# Patient Record
Sex: MALE | Race: BLACK OR AFRICAN AMERICAN | URBAN - METROPOLITAN AREA
[De-identification: names, ages, dates, MRNs, and addresses within clinical notes are randomized per-mention and may not be internally consistent; named-entity substitution may affect disease eponyms.]

---

## 2018-03-08 ENCOUNTER — OFFICE VISIT (OUTPATIENT)
Dept: FAMILY MEDICINE CLINIC | Age: 28
End: 2018-03-08

## 2018-03-08 VITALS
TEMPERATURE: 97.8 F | WEIGHT: 251 LBS | BODY MASS INDEX: 34 KG/M2 | HEART RATE: 90 BPM | RESPIRATION RATE: 17 BRPM | DIASTOLIC BLOOD PRESSURE: 93 MMHG | HEIGHT: 72 IN | SYSTOLIC BLOOD PRESSURE: 141 MMHG | OXYGEN SATURATION: 100 %

## 2018-03-08 DIAGNOSIS — Z11.3 SCREENING FOR STD (SEXUALLY TRANSMITTED DISEASE): Primary | ICD-10-CM

## 2018-03-08 DIAGNOSIS — I10 ESSENTIAL HYPERTENSION: ICD-10-CM

## 2018-03-08 RX ORDER — METRONIDAZOLE 500 MG/1
500 TABLET ORAL 2 TIMES DAILY
Qty: 14 TAB | Refills: 0 | Status: SHIPPED | OUTPATIENT
Start: 2018-03-08 | End: 2018-03-15

## 2018-03-08 NOTE — PROGRESS NOTES
Rola Meade is a 28 y.o.  male and presents with     Chief Complaint   Patient presents with   [address] Hypertension    Exposure to STD       Pt is sexually active with his female partmenr for a month. His partner went for routine testing and was told she had trichomonas. Pt had STD in 2009 - Gonorrhea and chlamydia. Pt wants to make sure that he does not have any STD. Pt does not think he has HTN. NO FH for HTN. History reviewed. No pertinent past medical history. Past Surgical History:   Procedure Laterality Date    HX ORTHOPAEDIC       Current Outpatient Prescriptions   Medication Sig    metroNIDAZOLE (FLAGYL) 500 mg tablet Take 1 Tab by mouth two (2) times a day for 7 days. No current facility-administered medications for this visit. Health Maintenance   Topic Date Due    DTaP/Tdap/Td series (1 - Tdap) 03/11/2010    Influenza Age 5 to Adult  08/01/2017       There is no immunization history on file for this patient. No LMP for male patient. Allergies and Intolerances:   No Known Allergies    Family History:   Family History   Problem Relation Age of Onset    Diabetes Mother     Diabetes Father     Hypertension Father        Social History:   He  reports that he has been smoking. He has never used smokeless tobacco.  He  reports that he drinks alcohol.             Review of Systems:   General: negative for - chills, fatigue, fever, weight change  Psych: negative for - anxiety, depression, irritability or mood swings  ENT: negative for - headaches, hearing change, nasal congestion, oral lesions, sneezing or sore throat  Heme/ Lymph: negative for - bleeding problems, bruising, pallor or swollen lymph nodes  Endo: negative for - hot flashes, polydipsia/polyuria or temperature intolerance  Resp: negative for - cough, shortness of breath or wheezing  CV: negative for - chest pain, edema or palpitations  GI: negative for - abdominal pain, change in bowel habits, constipation, diarrhea or nausea/vomiting  : negative for - dysuria, hematuria, incontinence, pelvic pain or vulvar/vaginal symptoms  MSK: negative for - joint pain, joint swelling or muscle pain  Neuro: negative for - confusion, headaches, seizures or weakness  Derm: negative for - dry skin, hair changes, rash or skin lesion changes          Physical:   Vitals:   Vitals:    03/08/18 0929   BP: (!) 141/93   Pulse: 90   Resp: 17   Temp: 97.8 °F (36.6 °C)   TempSrc: Oral   SpO2: 100%   Weight: 251 lb (113.9 kg)   Height: 6' (1.829 m)           Exam:   HEENT- atraumatic,normocephalic, awake, oriented, well nourished  Neck - supple,no enlarged lymph nodes, no JVD, no thyromegaly  Chest- CTA, no rhonchi, no crackles  Heart- rrr, no murmurs / gallop/rub  Abdomen- soft,BS+,NT, no hepatosplenomegaly  Ext - no c/c/edema   Neuro- no focal deficits. Power 5/5 all extremities  Skin - warm,dry, no obvious rashes. Pt declined  exam.      Review of Data:   LABS:   No results found for: WBC, HGB, HCT, PLT, HGBEXT, HCTEXT, PLTEXT  No results found for: NA, K, CL, CO2, GLU, BUN, CREA  No results found for: CHOL, CHOLX, CHLST, CHOLV, HDL, LDL, LDLC, DLDLP, TGLX, TRIGL, TRIGP  No results found for: GPT        Impression / Plan:        ICD-10-CM ICD-9-CM    1. Screening for STD (sexually transmitted disease) Z11.3 V74.5 CHLAMYDIA/NEISSERIA/TRICHOMONAS AMP      RPR      HIV 1/2 AG/AB, 4TH GENERATION,W RFLX CONFIRM      metroNIDAZOLE (FLAGYL) 500 mg tablet      HSV-1 AB, IGG GLYCOPROTEIN, G-SPECIFIC      HSV-2 AB, IGG GLYCOPROTEIN, G-SPECIFIC      HSV 1 AND 2 ABS, IGM   2. Essential hypertension G35 572.8 METABOLIC PANEL, COMPREHENSIVE     Low salt diet. Given that partmer is pos for Trich, will empirically treat pt. Explained to patient risk benefits of the medications. Advised patient to stop meds if having any side effects. Pt verbalized understanding of the instructions.     I have discussed the diagnosis with the patient and the intended plan as seen in the above orders. The patient has received an after-visit summary and questions were answered concerning future plans. I have discussed medication side effects and warnings with the patient as well. I have reviewed the plan of care with the patient, accepted their input and they are in agreement with the treatment goals. Reviewed plan of care. Patient has provided input and agrees with goals.     Follow-up Disposition: Not on Glo Manriquez MD

## 2018-03-08 NOTE — MR AVS SNAPSHOT
Cindy Ly 
 
 
 [address] [facility] [address] [place] [id number] 
[phone] Patient: Orlin Tanner MRN: [id number] Visit Information Date & Time Provider Department Dept. Phone Encounter #  
 3/8/2018  9:30 AM [id number] ALISON Huerta, [address] [facility] [phone] Follow-up Instructions Return in about 1 month (around 4/8/2018). Upcoming Health Maintenance Date Due DTaP/Tdap/Td series (1 - Tdap) 3/11/2010 Influenza Age 5 to Adult 8/1/2017 Allergies as of 3/8/2018  Review Complete On: 3/8/2018 By: [id number] ALISON Huerta MD  
 No Known Allergies Current Immunizations  Never Reviewed No immunizations on file. Not reviewed this visit You Were Diagnosed With   
  
 Codes Comments Screening for STD (sexually transmitted disease)    -  Primary ICD-10-CM: Z11.3 ICD-9-CM: V74.5 Essential hypertension     ICD-10-CM: I10 
ICD-9-CM: 401.9 Vitals BP Pulse Temp Resp Height(growth percentile) Weight(growth percentile) (!) 141/93 90 97.8 °F (36.6 °C) (Oral) 17 6' (1.829 m) 251 lb (113.9 kg) SpO2 BMI Smoking Status 100% 34.04 kg/m2 Current Every Day Smoker Vitals History BMI and BSA Data Body Mass Index Body Surface Area 34.04 kg/m 2 2.41 m 2 Preferred Pharmacy Pharmacy Name Phone [facility], [address] [place] [address] [phone] Your Updated Medication List  
  
   
This list is accurate as of 3/8/18 10:12 AM.  Always use your most recent med list.  
  
  
  
  
 metroNIDAZOLE 500 mg tablet Commonly known as:  FLAGYL Take 1 Tab by mouth two (2) times a day for 7 days. Prescriptions Sent to Pharmacy Refills  
 metroNIDAZOLE (FLAGYL) 500 mg tablet 0 Sig: Take 1 Tab by mouth two (2) times a day for 7 days.   
 Class: Normal  
 Pharmacy: \[facility]\"" [id number] [address] [facility], [facility] #: [phone] Route: Oral  
  
Follow-up Instructions Return in about 1 month (around 4/8/2018). To-Do List   
 03/08/2018 Lab:  CHLAMYDIA/NEISSERIA/TRICHOMONAS AMP   
  
 03/08/2018 Lab:  HIV 1/2 AG/AB, 4TH GENERATION,W RFLX CONFIRM   
  
 03/08/2018 Lab:  HSV 1 AND 2 ABS, IGM   
  
 03/08/2018 Lab:  HSV-1 AB, IGG GLYCOPROTEIN, G-SPECIFIC   
  
 03/08/2018 Lab:  HSV-2 AB, IGG GLYCOPROTEIN, G-SPECIFIC   
  
 03/08/2018 Lab:  METABOLIC PANEL, COMPREHENSIVE   
  
 03/08/2018 Lab:  [facility] Introducing [facility]! Yohana Guevara introduces Sylantro patient portal. Now you can access parts of your medical record, email your doctor's office, and request medication refills online. 1. In your internet browser, go to [email or web address] 2. Click on the First Time User? Click Here link in the Sign In box. You will see the New Member Sign Up page. 3. Enter your Sylantro Access Code exactly as it appears below. You will not need to use this code after youve completed the sign-up process. If you do not sign up before the expiration date, you must request a new code. · Sylantro Access Code: [email or web address] Expires: 6/6/2018  9:20 AM 
 
4. Enter the last four digits of your Social Security Number (xxxx) and Date of Birth (mm/dd/yyyy) as indicated and click Submit. You will be taken to the next sign-up page. 5. Create a [employer] ID. This will be your Sylantro login ID and cannot be changed, so think of one that is secure and easy to remember. 6. Create a Sylantro password. You can change your password at any time. 7. Enter your Password Reset Question and Answer. This can be used at a later time if you forget your password. 8. Enter your e-mail address. You will receive e-mail notification when new information is available in [address]. 9. Click Sign Up. You can now view and download portions of your medical record. 10. Click the Download Summary menu link to download a portable copy of your medical information. If you have questions, please visit the Frequently Asked Questions section of the Wellpepper website. Remember, Wellpepper is NOT to be used for urgent needs. For medical emergencies, dial 911. Now available from your iPhone and Android! Please provide this summary of care documentation to your next provider. Your primary care clinician is listed as Cheyanne Araujo. If you have any questions after today's visit, please call [phone].

## 2018-03-09 LAB
A-G RATIO,AGRAT: 1.6 RATIO (ref 1.1–2.6)
ALBUMIN SERPL-MCNC: 5 G/DL (ref 3.5–5)
ALP SERPL-CCNC: 67 U/L (ref 25–115)
ALT SERPL-CCNC: 15 U/L (ref 5–40)
ANION GAP SERPL CALC-SCNC: 15 MMOL/L
AST SERPL W P-5'-P-CCNC: 18 U/L (ref 10–37)
BILIRUB SERPL-MCNC: 0.3 MG/DL (ref 0.2–1.2)
BUN SERPL-MCNC: 12 MG/DL (ref 6–22)
CALCIUM SERPL-MCNC: 9.6 MG/DL (ref 8.4–10.4)
CHLAMYDIA AMPLIFIED URINE: NEGATIVE
CHLORIDE SERPL-SCNC: 101 MMOL/L (ref 98–110)
CO2 SERPL-SCNC: 26 MMOL/L (ref 20–32)
CREAT SERPL-MCNC: 1.2 MG/DL (ref 0.5–1.2)
GC AMPLIFIED URINE,919: NEGATIVE
GFRAA, 66117: >60
GFRNA, 66118: >60
GLOBULIN,GLOB: 3.1 G/DL (ref 2–4)
GLUCOSE SERPL-MCNC: 88 MG/DL (ref 70–99)
HIV -1/0/2 AG/AB WITH REFLEX, 13463: NON REACTIVE
HIV 1 & 2 AB SER-IMP: NORMAL
HSV 2 AB,IGG, HS2G: <0.2 AI
HSV1 IGG SER QL: <0.2 AI
POTASSIUM SERPL-SCNC: 4.2 MMOL/L (ref 3.5–5.5)
PROT SERPL-MCNC: 8.1 G/DL (ref 6.4–8.3)
SODIUM SERPL-SCNC: 142 MMOL/L (ref 133–145)
SYPHILIS (T. PALLIDUM) IGG, 15809: NON REACTIVE
TRICH NUCU, 17621: NEGATIVE

## 2018-03-13 LAB
HSV 1 IGM ANTIBODIES, 165181: NORMAL TITER
HSV 2 IGM ANTIBODIES, 165182: NORMAL TITER

## 2018-03-16 ENCOUNTER — TELEPHONE (OUTPATIENT)
Dept: FAMILY MEDICINE CLINIC | Age: 28
End: 2018-03-16

## 2018-03-19 NOTE — TELEPHONE ENCOUNTER
Labs were neg for STDs, only one test is pending, for Herpes . RPR, HIV, Chlamydia, Trich, Yusuf , metabolic panel were neg. HSV 1 and 2 IgG shows pending     Spoke with patient. Pending labs are in and all of them are negative. This encounter will be closed.

## 2018-04-09 ENCOUNTER — OFFICE VISIT (OUTPATIENT)
Dept: FAMILY MEDICINE CLINIC | Age: 28
End: 2018-04-09

## 2018-04-09 VITALS
HEART RATE: 71 BPM | TEMPERATURE: 97.6 F | BODY MASS INDEX: 34.27 KG/M2 | WEIGHT: 253 LBS | OXYGEN SATURATION: 98 % | SYSTOLIC BLOOD PRESSURE: 141 MMHG | DIASTOLIC BLOOD PRESSURE: 83 MMHG | RESPIRATION RATE: 16 BRPM | HEIGHT: 72 IN

## 2018-04-09 DIAGNOSIS — E66.9 OBESITY (BMI 30-39.9): ICD-10-CM

## 2018-04-09 DIAGNOSIS — R30.0 DYSURIA: ICD-10-CM

## 2018-04-09 DIAGNOSIS — I10 ESSENTIAL HYPERTENSION: ICD-10-CM

## 2018-04-09 DIAGNOSIS — Z11.3 SCREENING FOR STD (SEXUALLY TRANSMITTED DISEASE): Primary | ICD-10-CM

## 2018-04-09 NOTE — PROGRESS NOTES
1. Have you been to the ER, urgent care clinic since your last visit? Hospitalized since your last visit? No    2. Have you seen or consulted any other health care providers outside of the [address] [place] [address] since your last visit? Include any pap smears or colon screening.  No

## 2018-04-09 NOTE — PROGRESS NOTES
Nicolas Brewer is a 33 y.o.  male and presents with     Chief Complaint   Patient presents with    Follow-up    Hypertension       Pt says his girlfriend had trich and wants to be rechecked. Pt says high blood pressure runs in his family. Father has HTn and takes meds for HTN. History reviewed. No pertinent past medical history. Past Surgical History:   Procedure Laterality Date    HX ORTHOPAEDIC       No current outpatient prescriptions on file. No current facility-administered medications for this visit. Health Maintenance   Topic Date Due    Pneumococcal 19-64 Medium Risk (1 of 1 - PPSV23) 03/11/2008    DTaP/Tdap/Td series (1 - Tdap) 03/11/2010    Influenza Age 9 to Adult  Addressed       There is no immunization history on file for this patient. No LMP for male patient. Allergies and Intolerances:   No Known Allergies    Family History:   Family History   Problem Relation Age of Onset    Diabetes Mother     Diabetes Father     Hypertension Father        Social History:   He  reports that he has been smoking. He has never used smokeless tobacco.  He  reports that he drinks alcohol.             Review of Systems:   General: negative for - chills, fatigue, fever, weight change  Psych: negative for - anxiety, depression, irritability or mood swings  ENT: negative for - headaches, hearing change, nasal congestion, oral lesions, sneezing or sore throat  Heme/ Lymph: negative for - bleeding problems, bruising, pallor or swollen lymph nodes  Endo: negative for - hot flashes, polydipsia/polyuria or temperature intolerance  Resp: negative for - cough, shortness of breath or wheezing  CV: negative for - chest pain, edema or palpitations  GI: negative for - abdominal pain, change in bowel habits, constipation, diarrhea or nausea/vomiting  : negative for - dysuria, hematuria, incontinence, pelvic pain or vulvar/vaginal symptoms  MSK: negative for - joint pain, joint swelling or muscle pain  Neuro: negative for - confusion, headaches, seizures or weakness  Derm: negative for - dry skin, hair changes, rash or skin lesion changes          Physical:   Vitals:   Vitals:    04/09/18 0915   BP: 141/83   Pulse: 71   Resp: 16   Temp: 97.6 °F (36.4 °C)   TempSrc: Oral   SpO2: 98%   Weight: 253 lb (114.8 kg)   Height: 6' (1.829 m)           Exam:   HEENT- atraumatic,normocephalic, awake, oriented, well nourished  Neck - supple,no enlarged lymph nodes, no JVD, no thyromegaly  Chest- CTA, no rhonchi, no crackles  Heart- rrr, no murmurs / gallop/rub  Abdomen- soft,BS+,NT, no hepatosplenomegaly  Ext - no c/c/edema   Neuro- no focal deficits. Power 5/5 all extremities  Skin - warm,dry, no obvious rashes. Review of Data:   LABS:   No results found for: WBC, HGB, HCT, PLT, HGBEXT, HCTEXT, PLTEXT, HGBEXT, HCTEXT, PLTEXT  Lab Results   Component Value Date/Time    Sodium 142 03/08/2018 10:18 AM    Potassium 4.2 03/08/2018 10:18 AM    Chloride 101 03/08/2018 10:18 AM    CO2 26 03/08/2018 10:18 AM    Glucose 88 03/08/2018 10:18 AM    BUN 12 03/08/2018 10:18 AM    Creatinine 1.2 03/08/2018 10:18 AM     No results found for: CHOL, CHOLX, CHLST, CHOLV, HDL, LDL, LDLC, DLDLP, TGLX, TRIGL, TRIGP  No results found for: GPT        Impression / Plan:        ICD-10-CM ICD-9-CM    1. Screening for STD (sexually transmitted disease) Z11.3 V74.5 CT/NG/T.VAGINALIS AMPLIFICATION   2. Essential hypertension I10 401.9    3. Obesity (BMI 30-39. 9) E66.9 278.00 LIPID PANEL     Low salt diet. Explained to patient risk benefits of the medications. Advised patient to stop meds if having any side effects. Pt verbalized understanding of the instructions. I have discussed the diagnosis with the patient and the intended plan as seen in the above orders. The patient has received an after-visit summary and questions were answered concerning future plans.   I have discussed medication side effects and warnings with the patient as well. I have reviewed the plan of care with the patient, accepted their input and they are in agreement with the treatment goals. Reviewed plan of care. Patient has provided input and agrees with goals.     Follow-up Disposition: Not on [place] MD Catalino

## 2018-04-09 NOTE — MR AVS SNAPSHOT
[address] [facility] 
 
 
 [address] [facility] [address] [place] [id number] 
[phone] Patient: Erik Velazquez MRN: [id number] Visit Information Date & Time Provider Department Dept. Phone Encounter #  
 4/9/2018  9:00 AM Elicia Su, [address] [facility]  Follow-up Instructions Return in about 2 months (around 6/9/2018). Upcoming Health Maintenance Date Due Pneumococcal 19-64 Medium Risk (1 of 1 - PPSV23) 3/11/2008 DTaP/Tdap/Td series (1 - Tdap) 3/11/2010 Allergies as of 4/9/2018  Review Complete On: 3/8/2018 By: Elicia Su MD  
 No Known Allergies Current Immunizations  Never Reviewed No immunizations on file. Not reviewed this visit You Were Diagnosed With   
  
 Codes Comments Screening for STD (sexually transmitted disease)    -  Primary ICD-10-CM: Z11.3 ICD-9-CM: V74.5 Essential hypertension     ICD-10-CM: I10 
ICD-9-CM: 401.9 Obesity (BMI 30-39. 9)     ICD-10-CM: E66.9 ICD-9-CM: 278.00 Vitals BP Pulse Temp Resp Height(growth percentile) Weight(growth percentile) 141/83 71 97.6 °F (36.4 °C) (Oral) 16 6' (1.829 m) 253 lb (114.8 kg) SpO2 BMI Smoking Status 98% 34.31 kg/m2 Current Every Day Smoker Vitals History BMI and BSA Data Body Mass Index Body Surface Area  
 34.31 kg/m 2 2.41 m 2 Preferred Pharmacy Pharmacy Name Phone [facility] [facility], [facility] [phone] Your Updated Medication List  
  
Notice  As of 4/9/2018  9:50 AM  
 You have not been prescribed any medications. Follow-up Instructions Return in about 2 months (around 6/9/2018). To-Do List   
 04/09/2018 Lab:  CT/NG/T.VAGINALIS AMPLIFICATION   
  
 04/09/2018 Lab:  LIPID PANEL Introducing [facility]! [employer] introduces [employer]activ patient portal. Now you can access parts of your medical record, email your doctor's office, and request medication refills online. 1. In your internet browser, go to [email or web address] 2. Click on the First Time User? Click Here link in the Sign In box. You will see the New Member Sign Up page. 3. Enter your Transactiv Access Code exactly as it appears below. You will not need to use this code after youve completed the sign-up process. If you do not sign up before the expiration date, you must request a new code. · Transactiv Access Code: [email or web address] Expires: 6/6/2018 10:20 AM 
 
4. Enter the last four digits of your Social Security Number (xxxx) and Date of Birth (mm/dd/yyyy) as indicated and click Submit. You will be taken to the next sign-up page. 5. Create a Transactiv ID. This will be your Transactiv login ID and cannot be changed, so think of one that is secure and easy to remember. 6. Create a Transactiv password. You can change your password at any time. 7. Enter your Password Reset Question and Answer. This can be used at a later time if you forget your password. 8. Enter your e-mail address. You will receive e-mail notification when new information is available in [address] 9. Click Sign Up. You can now view and download portions of your medical record. 10. Click the Download Summary menu link to download a portable copy of your medical information. If you have questions, please visit the Frequently Asked Questions section of the Transactiv website. Remember, Transactiv is NOT to be used for urgent needs. For medical emergencies, dial 911. Now available from your iPhone and Android! Please provide this summary of care documentation to your next provider. Your primary care clinician is listed as [address] If you have any questions after today's visit, please call [phone].

## 2018-04-10 LAB
CHLAMYDIA AMPLIFIED URINE: NEGATIVE
CHOLEST SERPL-MCNC: 184 MG/DL (ref 110–200)
GC AMPLIFIED URINE,919: NEGATIVE
HDLC SERPL-MCNC: 3.7 MG/DL (ref 0–5)
HDLC SERPL-MCNC: 50 MG/DL (ref 40–59)
LDLC SERPL CALC-MCNC: 114 MG/DL (ref 50–99)
TRICH NUCU, 17621: NEGATIVE
TRIGL SERPL-MCNC: 98 MG/DL (ref 40–149)
VLDLC SERPL CALC-MCNC: 20 MG/DL (ref 8–30)

## 2018-04-11 LAB — RESULT: NORMAL

## 2019-10-04 ENCOUNTER — OFFICE VISIT (OUTPATIENT)
Dept: FAMILY MEDICINE CLINIC | Age: 29
End: 2019-10-04

## 2019-10-04 VITALS
HEART RATE: 65 BPM | TEMPERATURE: 97.9 F | HEIGHT: 72 IN | BODY MASS INDEX: 32.21 KG/M2 | SYSTOLIC BLOOD PRESSURE: 135 MMHG | DIASTOLIC BLOOD PRESSURE: 86 MMHG | WEIGHT: 237.8 LBS | RESPIRATION RATE: 18 BRPM | OXYGEN SATURATION: 98 %

## 2019-10-04 DIAGNOSIS — L73.9 CHRONIC FOLLICULITIS: Primary | ICD-10-CM

## 2019-10-04 DIAGNOSIS — E66.9 OBESITY (BMI 30.0-34.9): ICD-10-CM

## 2019-10-04 RX ORDER — DOXYCYCLINE 100 MG/1
100 TABLET ORAL 2 TIMES DAILY
Qty: 20 TAB | Refills: 0 | Status: SHIPPED | OUTPATIENT
Start: 2019-10-04 | End: 2019-10-14

## 2019-10-04 NOTE — PROGRESS NOTES
Jesus Fry is a 26 y.o. male presents in office for scalp infection. Health Maintenance Due   Topic Date Due    Pneumococcal 0-64 years (1 of 1 - PPSV23) 03/11/1995    DTaP/Tdap/Td series (1 - Tdap) 03/11/2010         1. Have you been to the ER, urgent care clinic since your last visit? Hospitalized since your last visit? No    2. Have you seen or consulted any other health care providers outside of the [address] [place] [address] since your last visit? Include any pap smears or colon screening. No    Patient declines flu vaccine.

## 2019-10-04 NOTE — PROGRESS NOTES
[address]     Chief Complaint   Patient presents with    Hair/Scalp Problem     ongoing x 6 months     Vitals:    10/04/19 1345   BP: 135/86   Pulse: 65   Resp: 18   Temp: 97.9 °F (36.6 °C)   TempSrc: Oral   SpO2: 98%   Weight: 237 lb 12.8 oz (107.9 kg)   Height: 6' (1.829 m)   PainSc:   0 - No pain         HPI: Patient is here to establish care, he has a complaint about scalp irritation and rash for the last 6 months, he had this infection before and was treated, patient has started getting hair twist about 6 months ago, he also oil  night and cover with a scarf , he thinks the problem is related to this routine, he had tried to use natural things like tea tree oil, with no much improvement. He does not have any other skin rash or any lesion over his body      Does not smoke does not drink alcohol no illicit drug abuse    He is not exercising regularly        History reviewed. No pertinent past medical history. Past Surgical History:   Procedure Laterality Date    HX ORTHOPAEDIC       Social History     Tobacco Use    Smoking status: Former Smoker    Smokeless tobacco: Never Used    Tobacco comment: \"black and milds\"   Substance Use Topics    Alcohol use: Yes     Frequency: Monthly or less     Comment: ocasionally       Family History   Problem Relation Age of Onset    Diabetes Mother     Diabetes Father     Hypertension Father        Review of Systems   Constitutional: Negative for chills, fever, malaise/fatigue and weight loss. HENT: Negative for congestion, ear discharge, ear pain, hearing loss and nosebleeds. Eyes: Negative for blurred vision, double vision and discharge. Respiratory: Negative for cough. Cardiovascular: Negative for chest pain, palpitations, claudication and leg swelling. Gastrointestinal: Negative for abdominal pain, constipation, diarrhea, nausea and vomiting. Genitourinary: Negative for dysuria, frequency and urgency. Musculoskeletal: Negative for myalgias. Skin: Positive for itching. Negative for rash. Neurological: Negative for dizziness, tingling, sensory change, speech change, focal weakness, weakness and headaches. Psychiatric/Behavioral: Negative for depression and suicidal ideas. Physical Exam   Constitutional: He is oriented to person, place, and time. He appears well-developed and well-nourished. No distress. HENT:   Head: Normocephalic and atraumatic. Mouth/Throat: No oropharyngeal exudate. Eyes: Pupils are equal, round, and reactive to light. Conjunctivae are normal. Right eye exhibits no discharge. Left eye exhibits no discharge. No scleral icterus. Neck: Normal range of motion. Neck supple. No thyromegaly present. Cardiovascular: Normal rate, regular rhythm and normal heart sounds. Pulmonary/Chest: Effort normal and breath sounds normal. No respiratory distress. He has no rales. Abdominal: Soft. Bowel sounds are normal. He exhibits no distension and no mass. There is no tenderness. There is no rebound. Musculoskeletal: Normal range of motion. He exhibits no edema, tenderness or deformity. Lymphadenopathy:     He has no cervical adenopathy. Neurological: He is alert and oriented to person, place, and time. No cranial nerve deficit. Coordination normal.   Skin: Skin is warm and dry. Rash noted. He is not diaphoretic. There is erythema. Patient is called his form of irritated follicles, and the wrist area as well as scab due to scratching and bleeding   Psychiatric: He has a normal mood and affect. Judgment and thought content normal.        Assessment and plan     Plan of care has been discussed with the patient, he agrees to the plan and verbalized understanding. All his questions were answered  More than 50% of the time spent in this visit was counseling the patient about  illness and treatment options         1.  Chronic folliculitis  Patient advised about regularly washing his hair with water that has apple cider vinegar at least once a week and avoid heavy oil, and also avoid pulling on his hair  - doxycycline (ADOXA) 100 mg tablet; Take 1 Tab by mouth two (2) times a day for 10 days. Dispense: 20 Tab; Refill: 0    2. Obesity (BMI 30.0-34. 9)  Lifestyle modification has been discussed with patient as well as exercise    Patient was encouraged to schedule annual physical examination    There are no active problems to display for this patient. Results for orders placed or performed in visit on 04/09/18   CULTURE, URINE   Result Value Ref Range    RESULT Normal    LIPID PANEL   Result Value Ref Range    Triglyceride 98 40 - 149 mg/dL    HDL Cholesterol 50 40 - 59 mg/dL    Cholesterol, total 184 110 - 200 mg/dL    CHOLESTEROL/HDL 3.7 0.0 - 5.0    LDL, calculated 114 (H) 50 - 99 mg/dL    VLDL, calculated 20 8 - 30 mg/dL   CHLAM/GC/TRICH NUCL AMP URINE   Result Value Ref Range    TRICH NUCU Negative Negative    Chlamydia amplified urine Negative Negative    GC Amplified urine Negative Negative     No visits with results within 3 Month(s) from this visit. Latest known visit with results is:   Office Visit on 04/09/2018   Component Date Value Ref Range Status    Triglyceride 04/09/2018 98  40 - 149 mg/dL Final    HDL Cholesterol 04/09/2018 50  40 - 59 mg/dL Final    Cholesterol, total 04/09/2018 184  110 - 200 mg/dL Final    CHOLESTEROL/HDL 04/09/2018 3.7  0.0 - 5.0 Final    LDL, calculated 04/09/2018 114* 50 - 99 mg/dL Final    VLDL, calculated 04/09/2018 20  8 - 30 mg/dL Final    Comment: Test includes cholesterol, HDL cholesterol, triglycerides and LDL. Cholesterol Recommended NCEP guidelines in mg/dL:  Less than 200            Desirable  200 - 239                Borderline High  Greater than or  = 240   High  Please Note:  Total Chol/HDL Ratio                   Men     Women  1/2 Avg. Risk    3.4     3.3      Avg. Risk    5.0     4.4  2X  Avg. Risk    9.6     7.1  3X  Avg.  Risk   23.4    11.0      RESULT 04/09/2018 Normal Final    Comment: Updated: [id number]  LAB ACC#: [id number]  SOURCE: Clean Catch Urine  --FINAL REPORT--  No Growth      TRICH NUCU 04/09/2018 Negative  Negative Final    Chlamydia amplified urine 04/09/2018 Negative  Negative Final    GC Amplified urine 04/09/2018 Negative  Negative Final    Comment: This assay was developed, and its performance characteristics were determined  by the [facility]. This test is not  FDA-approved for female urine for CT/NG testing. This specimen type was  validated in the Serology Department. This assay/method meets or exceeds the   standards established by CLIA. Documentation is on file at  [facility]. This test is used for clinical  purposes. It should not be regarded as investigational or for research.             Follow-up and Dispositions    · Return if symptoms worsen or fail to improve, for for annual physical.

## 2019-10-04 NOTE — PATIENT INSTRUCTIONS
Folliculitis: Care Instructions Your Care Instructions Folliculitis (say \"mjn-OOI-ydu-LY-tus\") is an infection of the pouches (follicles) in the skin where hair grows. It can occur on any part of the body, but it is most common on the scalp, face, armpits, and groin. Bacteria, such as those found in a hot tub, can cause folliculitis. Folliculitis begins as a red, tender area near a strand of hair. The skin can itch or burn and may drain pus or blood. Sometimes folliculitis can lead to more serious skin infections. Your doctor usually can treat mild folliculitis with an antibiotic cream or ointment. If you have folliculitis on your scalp, you may use a shampoo that kills bacteria. Antibiotics you take as pills can treat infections deeper in the skin. For stubborn cases of folliculitis, laser treatment may be an option. Laser treatment uses strong beams of light to destroy the hair follicle. But hair will no longer grow in the treated area. Follow-up care is a key part of your treatment and safety. Be sure to make and go to all appointments, and call your doctor if you are having problems. It's also a good idea to know your test results and keep a list of the medicines you take. How can you care for yourself at home? · Take your medicine exactly as prescribed. If your doctor prescribed antibiotics, take them as directed. Do not stop taking them just because you feel better. You need to take the full course of antibiotics. · Use a soap that kills bacteria to wash the infected area. If your scalp or beard is infected, use a shampoo with selenium or propylene glycol. Be careful. Do not scrub too long or too hard. · Mix 1 1/3 cup warm water and 1 tablespoon vinegar. Soak a cloth in the mixture, and place it over the infected skin until it cools off (usually 5 to 10 minutes). You can do this 3 to 6 times a day. · Do not share your razor, towel, or washcloth. That can spread folliculitis. · Use a new blade in your razor each time you shave to keep from re-infecting your skin. · If you tend to get folliculitis, avoid using hot tubs. They can contain bacteria that cause folliculitis. When should you call for help? Call your doctor now or seek immediate medical care if: 
  · You have symptoms of infection, such as: 
? Increased pain, swelling, warmth, or redness. ? Red streaks leading from the area. ? Pus draining from the area. ? A fever.  
 Watch closely for changes in your health, and be sure to contact your doctor if: 
  · You do not get better as expected. Where can you learn more? Go to http://ronan-jamin.info/. Enter M257 in the search box to learn more about \"Folliculitis: Care Instructions. \" Current as of: April 1, 2019 Content Version: 12.2 © 1917-1408 [employer]. Care instructions adapted under license by [employer] (which disclaims liability or warranty for this information). If you have questions about a medical condition or this instruction, always ask your healthcare professional. [address] any warranty or liability for your use of this information.

## 2019-11-02 ENCOUNTER — OFFICE VISIT (OUTPATIENT)
Dept: FAMILY MEDICINE CLINIC | Age: 29
End: 2019-11-02

## 2019-11-02 VITALS
DIASTOLIC BLOOD PRESSURE: 89 MMHG | RESPIRATION RATE: 16 BRPM | OXYGEN SATURATION: 98 % | TEMPERATURE: 98.1 F | SYSTOLIC BLOOD PRESSURE: 135 MMHG | HEIGHT: 72 IN | HEART RATE: 66 BPM | WEIGHT: 243 LBS | BODY MASS INDEX: 32.91 KG/M2

## 2019-11-02 DIAGNOSIS — L73.9 FOLLICULITIS: Primary | ICD-10-CM

## 2019-11-02 DIAGNOSIS — L73.9 CHRONIC FOLLICULITIS: ICD-10-CM

## 2019-11-02 DIAGNOSIS — H04.123 DRY EYES: ICD-10-CM

## 2019-11-02 RX ORDER — DOXYCYCLINE 100 MG/1
100 TABLET ORAL 2 TIMES DAILY
Qty: 20 TAB | Refills: 0 | Status: SHIPPED | OUTPATIENT
Start: 2019-11-02 | End: 2019-11-12

## 2019-11-02 RX ORDER — BACITRACIN ZINC 500 UNIT/G
OINTMENT (GRAM) TOPICAL 2 TIMES DAILY
Qty: 15 G | Refills: 0 | Status: SHIPPED | OUTPATIENT
Start: 2019-11-02

## 2019-11-02 NOTE — PROGRESS NOTES
[address]     Chief Complaint   Patient presents with    Obesity     f/u    Other     CHRONIC FOLLICULITIS f/u     Vitals:    11/02/19 1342   BP: 135/89   Pulse: 66   Resp: 16   Temp: 98.1 °F (36.7 °C)   TempSrc: Oral   SpO2: 98%   Weight: 243 lb (110.2 kg)   Height: 6' (1.829 m)   PainSc:   0 - No pain         HPI: Patient is here for follow-up on folliculitis, he took doxycycline 200 twice a day for 10days he finished about 2 weeks ago, he noticed much improvement, but there are a few lesions and is still infected. Blood pressure is borderline, patient advised about low-salt diet increase physical activity. Patient will get annual physical examination as soon as he isgets insurance      No past medical history on file. Past Surgical History:   Procedure Laterality Date    HX ORTHOPAEDIC       Social History     Tobacco Use    Smoking status: Former Smoker    Smokeless tobacco: Never Used    Tobacco comment: \"black and milds\"   Substance Use Topics    Alcohol use: Yes     Frequency: Monthly or less     Comment: ocasionally       Family History   Problem Relation Age of Onset    Diabetes Mother     Diabetes Father     Hypertension Father        Review of Systems   Constitutional: Negative for chills, fever, malaise/fatigue and weight loss. HENT: Negative for congestion, ear discharge, ear pain, hearing loss, nosebleeds and sore throat. Eyes: Negative for blurred vision, double vision and discharge. Respiratory: Negative for cough, hemoptysis, sputum production and shortness of breath. Cardiovascular: Negative for chest pain, palpitations, claudication and leg swelling. Gastrointestinal: Negative for abdominal pain, constipation, diarrhea, nausea and vomiting. Genitourinary: Negative for dysuria, frequency and urgency. Musculoskeletal: Negative for myalgias. Skin: Negative for itching and rash.    Neurological: Negative for dizziness, tingling, sensory change, speech change, focal weakness, weakness and headaches. Psychiatric/Behavioral: Negative for depression and suicidal ideas. Physical Exam   Constitutional: He is oriented to person, place, and time. He appears well-developed and well-nourished. No distress. HENT:   Head: Normocephalic and atraumatic. Mouth/Throat: No oropharyngeal exudate. Eyes: Pupils are equal, round, and reactive to light. Conjunctivae are normal. Right eye exhibits no discharge. Left eye exhibits no discharge. No scleral icterus. Neck: Normal range of motion. Neck supple. No thyromegaly present. Cardiovascular: Normal rate, regular rhythm and normal heart sounds. Pulmonary/Chest: Effort normal and breath sounds normal. No respiratory distress. He has no rales. Abdominal: Soft. Bowel sounds are normal. He exhibits no distension and no mass. There is no tenderness. There is no rebound. Musculoskeletal: Normal range of motion. He exhibits no edema, tenderness or deformity. Lymphadenopathy:     He has no cervical adenopathy. Neurological: He is alert and oriented to person, place, and time. No cranial nerve deficit. Coordination normal.   Skin: Skin is warm and dry. No rash noted. He is not diaphoretic. No erythema. Psychiatric: He has a normal mood and affect. Judgment and thought content normal.   Nursing note and vitals reviewed. Assessment and plan     Plan of care has been discussed with the patient, he agrees to the plan and verbalized understanding. All his questions were answered  More than 50% of the time spent in this visit was counseling the patient about  illness and treatment options         1. Folliculitis    - doxycycline (ADOXA) 100 mg tablet; Take 1 Tab by mouth two (2) times a day for 10 days. Dispense: 20 Tab; Refill: 0  - bacitracin zinc (BACITRACIN) ointment; Apply  to affected area two (2) times a day. Dispense: 15 g; Refill: 0    2.  Chronic folliculitis    - bacitracin zinc (BACITRACIN) ointment; Apply  to affected area two (2) times a day. Dispense: 15 g; Refill: 0        There are no active problems to display for this patient. Results for orders placed or performed in visit on 04/09/18   CULTURE, URINE   Result Value Ref Range    RESULT Normal    LIPID PANEL   Result Value Ref Range    Triglyceride 98 40 - 149 mg/dL    HDL Cholesterol 50 40 - 59 mg/dL    Cholesterol, total 184 110 - 200 mg/dL    CHOLESTEROL/HDL 3.7 0.0 - 5.0    LDL, calculated 114 (H) 50 - 99 mg/dL    VLDL, calculated 20 8 - 30 mg/dL   CHLAM/GC/TRICH NUCL AMP URINE   Result Value Ref Range    TRICH NUCU Negative Negative    Chlamydia amplified urine Negative Negative    GC Amplified urine Negative Negative     No visits with results within 3 Month(s) from this visit. Latest known visit with results is:   Office Visit on 04/09/2018   Component Date Value Ref Range Status    Triglyceride 04/09/2018 98  40 - 149 mg/dL Final    HDL Cholesterol 04/09/2018 50  40 - 59 mg/dL Final    Cholesterol, total 04/09/2018 184  110 - 200 mg/dL Final    CHOLESTEROL/HDL 04/09/2018 3.7  0.0 - 5.0 Final    LDL, calculated 04/09/2018 114* 50 - 99 mg/dL Final    VLDL, calculated 04/09/2018 20  8 - 30 mg/dL Final    Comment: Test includes cholesterol, HDL cholesterol, triglycerides and LDL. Cholesterol Recommended NCEP guidelines in mg/dL:  Less than 200            Desirable  200 - 239                Borderline High  Greater than or  = 240   High  Please Note:  Total Chol/HDL Ratio                   Men     Women  1/2 Avg. Risk    3.4     3.3      Avg. Risk    5.0     4.4  2X  Avg. Risk    9.6     7.1  3X  Avg.  Risk   23.4    11.0      RESULT 04/09/2018 Normal   Final    Comment: Updated: [id number]  LAB ACC#: [id number]  SOURCE: Clean Catch Urine  --FINAL REPORT--  No Growth      TRICH NUCU 04/09/2018 Negative  Negative Final    Chlamydia amplified urine 04/09/2018 Negative  Negative Final    GC Amplified urine 04/09/2018 Negative Negative Final    Comment: This assay was developed, and its performance characteristics were determined  by the [email or web address]. This test is not  FDA-approved for female urine for CT/NG testing. This specimen type was  validated in the Serology Department. This assay/method meets or exceeds the   standards established by CLIA. Documentation is on file at  [facility]. This test is used for clinical  purposes. It should not be regarded as investigational or for research. Follow-up and Dispositions    · Return for as needed.

## 2019-11-02 NOTE — PROGRESS NOTES
Soo Danielson is a 26 y.o. male (: 1990) presenting to address:    Chief Complaint   Patient presents with    Obesity     f/u    Other     CHRONIC FOLLICULITIS f/u       Vitals:    19 1342   BP: 135/89   Pulse: 66   Resp: 16   Temp: 98.1 °F (36.7 °C)   TempSrc: Oral   SpO2: 98%   Weight: 243 lb (110.2 kg)   Height: 6' (1.829 m)   PainSc:   0 - No pain       Hearing/Vision:   No exam data present    Learning Assessment:     Learning Assessment 2019   PRIMARY LEARNER Patient   HIGHEST LEVEL OF EDUCATION - PRIMARY LEARNER  -   BARRIERS PRIMARY LEARNER -   CO-LEARNER CAREGIVER -   PRIMARY LANGUAGE ENGLISH   LEARNER PREFERENCE PRIMARY PICTURES     LISTENING     VIDEOS     DEMONSTRATION     READING   ANSWERED BY patient   RELATIONSHIP SELF     Depression Screening:     3 most recent PHQ Screens 2019   Little interest or pleasure in doing things Not at all   Feeling down, depressed, irritable, or hopeless Not at all   Total Score PHQ 2 0     Fall Risk Assessment:     Fall Risk Assessment, last 12 mths 2019   Able to walk? Yes   Fall in past 12 months? No     Abuse Screening:     Abuse Screening Questionnaire 2019   Do you ever feel afraid of your partner? N   Are you in a relationship with someone who physically or mentally threatens you? N   Is it safe for you to go home? Y     Coordination of Care Questionaire:   1. Have you been to the ER, urgent care clinic since your last visit? Hospitalized since your last visit? NO    2. Have you seen or consulted any other health care providers outside of the [address] [place] [address] since your last visit? Include any pap smears or colon screening.  NO

## 2019-11-02 NOTE — PATIENT INSTRUCTIONS
Low Sodium Diet (2,000 Milligram): Care Instructions Your Care Instructions Too much sodium causes your body to hold on to extra water. This can raise your blood pressure and force your heart and kidneys to work harder. In very serious cases, this could cause you to be put in the hospital. It might even be life-threatening. By limiting sodium, you will feel better and lower your risk of serious problems. The most common source of sodium is salt. People get most of the salt in their diet from canned, prepared, and packaged foods. Fast food and restaurant meals also are very high in sodium. Your doctor will probably limit your sodium to less than 2,000 milligrams (mg) a day. This limit counts all the sodium in prepared and packaged foods and any salt you add to your food. Follow-up care is a key part of your treatment and safety. Be sure to make and go to all appointments, and call your doctor if you are having problems. It's also a good idea to know your test results and keep a list of the medicines you take. How can you care for yourself at home? Read food labels · Read labels on cans and food packages. The labels tell you how much sodium is in each serving. Make sure that you look at the serving size. If you eat more than the serving size, you have eaten more sodium. · Food labels also tell you the Percent Daily Value for sodium. Choose products with low Percent Daily Values for sodium. · Be aware that sodium can come in forms other than salt, including monosodium glutamate (MSG), sodium citrate, and sodium bicarbonate (baking soda). MSG is often added to Asian food. When you eat out, you can sometimes ask for food without MSG or added salt. Buy low-sodium foods · Buy foods that are labeled \"unsalted\" (no salt added), \"sodium-free\" (less than 5 mg of sodium per serving), or \"low-sodium\" (less than 140 mg of sodium per serving).  Foods labeled \"reduced-sodium\" and \"light sodium\" may still have too much sodium. Be sure to read the label to see how much sodium you are getting. · Buy fresh vegetables, or frozen vegetables without added sauces. Buy low-sodium versions of canned vegetables, soups, and other canned goods. Prepare low-sodium meals · Cut back on the amount of salt you use in cooking. This will help you adjust to the taste. Do not add salt after cooking. One teaspoon of salt has about 2,300 mg of sodium. · Take the salt shaker off the table. · Flavor your food with garlic, lemon juice, onion, vinegar, herbs, and spices. Do not use soy sauce, lite soy sauce, steak sauce, onion salt, garlic salt, celery salt, mustard, or ketchup on your food. · Use low-sodium salad dressings, sauces, and ketchup. Or make your own salad dressings and sauces without adding salt. · Use less salt (or none) when recipes call for it. You can often use half the salt a recipe calls for without losing flavor. Other foods such as rice, pasta, and grains do not need added salt. · Rinse canned vegetables, and cook them in fresh water. This removes somebut not allof the salt. · Avoid water that is naturally high in sodium or that has been treated with water softeners, which add sodium. Call your local water company to find out the sodium content of your water supply. If you buy bottled water, read the label and choose a sodium-free brand. Avoid high-sodium foods · Avoid eating: 
? Smoked, cured, salted, and canned meat, fish, and poultry. ? Ham, acosta, hot dogs, and luncheon meats. ? Regular, hard, and processed cheese and regular peanut butter. ? Crackers with salted tops, and other salted snack foods such as pretzels, chips, and salted popcorn. ? Frozen prepared meals, unless labeled low-sodium. ? Canned and dried soups, broths, and bouillon, unless labeled sodium-free or low-sodium. ? Canned vegetables, unless labeled sodium-free or low-sodium. ? [place] fries, pizza, tacos, and other fast foods. ? Pickles, olives, ketchup, and other condiments, especially soy sauce, unless labeled sodium-free or low-sodium. Where can you learn more? Go to http://ronan-jamin.info/. Enter B624 in the search box to learn more about \"Low Sodium Diet (2,000 Milligram): Care Instructions. \" Current as of: November 7, 2018 Content Version: 12.2 © 1887-7335 [employer]. Care instructions adapted under license by [employer] (which disclaims liability or warranty for this information). If you have questions about a medical condition or this instruction, always ask your healthcare professional. [address] any warranty or liability for your use of this information. How to Read a Food Label to Limit Sodium: Care Instructions Your Care Instructions Sodium causes your body to hold on to extra water. This can raise your blood pressure and force your heart and kidneys to work harder. In very serious cases, this could cause you to be put in the hospital. It might even be life-threatening. By limiting sodium, you will feel better and lower your risk of serious problems. Processed foods, fast food, and restaurant foods are the major sources of dietary sodium. The most common name for sodium is salt. Try to limit how much sodium you eat to less than 2,300 milligrams (mg) a day. If you limit your sodium to 1,500 mg a day, you can lower your blood pressure even more. This limit counts all the salt that you eat in foods you cook or in packaged foods. Keep a list of everything you eat and drink. Follow-up care is a key part of your treatment and safety. Be sure to make and go to all appointments, and call your doctor if you are having problems. It's also a good idea to know your test results and keep a list of the medicines you take. How can you care for yourself at home? Read ingredient lists on food labels · Read the list of ingredients on food labels to help you find how much sodium is in a food. The label lists the ingredients in a food in descending order (from the most to the least). If salt or sodium is high on the list, there may be a lot of sodium in the food. · Know that sodium has different names. Sodium is also called monosodium glutamate (MSG, common in [facility] food), sodium citrate, sodium alginate, sodium hydroxide, and sodium phosphate. Read Nutrition Facts labels · On most foods, there is a Nutrition Facts label. This will tell you how much sodium is in one serving of food. Look at both the serving size and the sodium amount. The serving size is located at the top of the label, usually right under the \"Nutrition Facts\" title. The amount of sodium is given in the list under the title. It is given in milligrams (mg). ? Check the serving size carefully. A single serving is often very small, and you may eat more than one serving. If this is the case, you will eat more sodium than listed on the label. For example, if the serving size for a canned soup is 1 cup and the sodium amount is 470 mg, if you have 2 cups you will eat 940 mg of sodium. · The nutrition facts for fresh fruits and vegetables are not listed on the food. They may be listed somewhere in the store. These foods usually have no sodium or low sodium. · The Nutrition Facts label also gives you the Percent Daily Value for sodium. This is how much of the recommended amount of sodium a serving contains. The daily value for sodium is less than 2,300 mg. So if the Percent Daily Value says 50%, this means one serving is giving you half of this, or 1,150 mg. Buy low-sodium foods · Look for foods that are made with less sodium. Watch for the following words on the label. ? \"Unsalted\" means there is no sodium added to the food. But there may be sodium already in the food naturally. ? \"Sodium-free\" means a serving has less than 5 mg of sodium. ? \"Very low sodium\" means a serving has 35 mg or less of sodium. ? \"Low-sodium\" means a serving has 140 mg or less of sodium. · \"Reduced-sodium\" means that there is 25% less sodium than what the food normally has. This is still usually too much sodium. Try not to buy foods with this on the label. · Buy fresh vegetables, or frozen vegetables without added sauces. Buy low-sodium versions of canned vegetables, soups, and other canned goods. Where can you learn more? Go to http://ronanShopLocketjamin.info/. Enter 26 [id number] in the search box to learn more about \"How to Read a Food Label to Limit Sodium: Care Instructions. \" Current as of: November 7, 2018 Content Version: 12.2 © 1649-6358 [employer]. Care instructions adapted under license by [employer] (which disclaims liability or warranty for this information). If you have questions about a medical condition or this instruction, always ask your healthcare professional. [place] [address] any warranty or liability for your use of this information.
